# Patient Record
Sex: MALE | Race: WHITE | NOT HISPANIC OR LATINO | Employment: FULL TIME | URBAN - METROPOLITAN AREA
[De-identification: names, ages, dates, MRNs, and addresses within clinical notes are randomized per-mention and may not be internally consistent; named-entity substitution may affect disease eponyms.]

---

## 2022-11-01 ENCOUNTER — OFFICE VISIT (OUTPATIENT)
Dept: ORTHOPEDIC SURGERY | Facility: CLINIC | Age: 27
End: 2022-11-01

## 2022-11-01 VITALS — HEIGHT: 74 IN | OXYGEN SATURATION: 98 % | BODY MASS INDEX: 40.43 KG/M2 | HEART RATE: 74 BPM | WEIGHT: 315 LBS

## 2022-11-01 DIAGNOSIS — M75.42 IMPINGEMENT SYNDROME OF LEFT SHOULDER: ICD-10-CM

## 2022-11-01 DIAGNOSIS — M75.41 IMPINGEMENT SYNDROME OF RIGHT SHOULDER: Primary | ICD-10-CM

## 2022-11-01 PROCEDURE — 99203 OFFICE O/P NEW LOW 30 MIN: CPT | Performed by: ORTHOPAEDIC SURGERY

## 2022-11-01 RX ORDER — DICLOFENAC SODIUM 75 MG/1
75 TABLET, DELAYED RELEASE ORAL 2 TIMES DAILY
Qty: 60 TABLET | Refills: 1 | Status: SHIPPED | OUTPATIENT
Start: 2022-11-01

## 2022-11-01 RX ORDER — IBUPROFEN 800 MG/1
800 TABLET ORAL EVERY 8 HOURS PRN
COMMUNITY

## 2022-11-01 NOTE — PROGRESS NOTES
"Chief Complaint  Initial Evaluation of the Left Shoulder and Initial Evaluation of the Right Shoulder     Subjective      Tenzin Toro presents to [facility] for bilateral shoulders. Patient left shoulder was dislocated when he was 16 and no treatment.  Four years later did some surgery to fix.  Patient here for a check up.  Patient here with just stiffness no pain.  Right shoulder having no ROM issues but pain and clicking.  No new injury.  Patient reports pain in right elbow and not sure if relates to right shoulder.  Right shoulder pain is just the last year.      No Known Allergies     Social History     Socioeconomic History   • Marital status:    Tobacco Use   • Smoking status: Former     Types: Cigarettes   • Smokeless tobacco: Never        Review of Systems     Objective   Vital Signs:   Pulse 74   Ht 188 cm (74\")   Wt (!) 145 kg (320 lb)   SpO2 98%   BMI 41.09 kg/m²       Physical Exam  Constitutional:       Appearance: Normal appearance. Patient is well-developed and normal weight.   HENT:      Head: Normocephalic.      Right Ear: Hearing and external ear normal.      Left Ear: Hearing and external ear normal.      Nose: Nose normal.   Eyes:      Conjunctiva/sclera: Conjunctivae normal.   Cardiovascular:      Rate and Rhythm: Normal rate.   Pulmonary:      Effort: Pulmonary effort is normal.      Breath sounds: No wheezing or rales.   Abdominal:      Palpations: Abdomen is soft.      Tenderness: There is no abdominal tenderness.   Musculoskeletal:      Cervical back: Normal range of motion.   Skin:     Findings: No rash.   Neurological:      Mental Status: Patient is alert and oriented to person, place, and time.   Psychiatric:         Mood and Affect: Mood and affect normal.         Judgment: Judgment normal.       Ortho Exam      LEFT SHOULDER radial pulse 2+. Ulnar pulse 2+. Good tone of deltoid, bicep, triceps, wrist extensors and flexors. Sensation intact. " Neurovascular Intact. ROM full.  Negative impingement testing.     RIGHT SHOULDER Sensation intact. Neurovascular Intact. Radial pulse 2+. Ulnar pulse 2+. Good tone of hip flexors, hip extensors, and hip abductors. ROM full with pain at times. Positive impingement testing with internal rotation.        Procedures      Imaging Results (Most Recent)     None           Result Review :       Brought X-rays and left shoulder has no hardware complications.  No fracture.         Assessment and Plan     Diagnoses and all orders for this visit:    1. Impingement syndrome of right shoulder (Primary)    2. Impingement syndrome of left shoulder        Discussed conservative measures as exercises, anti-inflammatory and injection. Patient given anti-inflammatories and HEP for shoulder and elbow tendonitis.     Call or return if worsening symptoms.    Follow Up     PRN      Patient was given instructions and counseling regarding his condition or for health maintenance advice. Please see specific information pulled into the AVS if appropriate.     Scribed for August Aguilar MD by Joanie Penny MA.  11/01/22   11:04 EDT    I have personally performed the services described in this document as scribed by the above individual and it is both accurate and complete. August Aguilar MD 11/01/22

## 2024-01-21 ENCOUNTER — HOSPITAL ENCOUNTER (EMERGENCY)
Facility: HOSPITAL | Age: 29
Discharge: HOME | End: 2024-01-21
Attending: EMERGENCY MEDICINE | Admitting: EMERGENCY MEDICINE
Payer: COMMERCIAL

## 2024-01-21 VITALS
BODY MASS INDEX: 40.43 KG/M2 | TEMPERATURE: 98.4 F | HEIGHT: 74 IN | OXYGEN SATURATION: 98 % | WEIGHT: 315 LBS | RESPIRATION RATE: 18 BRPM | DIASTOLIC BLOOD PRESSURE: 81 MMHG | HEART RATE: 82 BPM | SYSTOLIC BLOOD PRESSURE: 150 MMHG

## 2024-01-21 DIAGNOSIS — L02.212 ABSCESS OF BACK: Primary | ICD-10-CM

## 2024-01-21 PROCEDURE — 99282 EMERGENCY DEPT VISIT SF MDM: CPT

## 2024-01-21 RX ORDER — CEPHALEXIN 500 MG/1
500 CAPSULE ORAL 4 TIMES DAILY
Qty: 40 CAPSULE | Refills: 0 | Status: SHIPPED | OUTPATIENT
Start: 2024-01-21

## 2024-01-21 NOTE — DISCHARGE INSTRUCTIONS
Apply warm compresses to area 6 times a day for 15-20 minutes at a time. Follow up with your primary care provider for wound check. It does not appear at this time that you need to have incision and drainage performed.

## 2024-01-21 NOTE — ED PROVIDER NOTES
Time: 2:55 PM EST  Date of encounter:  1/21/2024  Independent Historian/Clinical History and Information was obtained by:   Patient    History is limited by: N/A    Chief Complaint: cyst/abscess on back      History of Present Illness:  Patient is a 28 y.o. year old male who presents to the emergency department for evaluation of area of swelling on right mid thoracic back area present for over 1 year. The last few days the area has become more painful, reddened and swollen. His PCP referred him to dermatology about one year ago but he didn't follow up. Denies drainage, fever or new injury to the area.       History provided by:  Patient      Patient Care Team  Primary Care Provider: Sanket Valerio MD    Past Medical History:     No Known Allergies  History reviewed. No pertinent past medical history.  History reviewed. No pertinent surgical history.  History reviewed. No pertinent family history.    Home Medications:  Prior to Admission medications    Medication Sig Start Date End Date Taking? Authorizing Provider   diclofenac (VOLTAREN) 75 MG EC tablet Take 1 tablet by mouth 2 (Two) Times a Day. 11/1/22   August Aguilar MD   ibuprofen (ADVIL,MOTRIN) 800 MG tablet Take 1 tablet by mouth Every 8 (Eight) Hours As Needed for Mild Pain.    Provider, MD Janna        Social History:   Social History     Tobacco Use    Smoking status: Former     Types: Cigarettes    Smokeless tobacco: Never         Review of Systems:  Review of Systems   Constitutional:  Negative for chills and fever.   HENT:  Negative for congestion, ear pain and sore throat.    Eyes:  Negative for pain.   Respiratory:  Negative for cough, chest tightness and shortness of breath.    Cardiovascular:  Negative for chest pain.   Gastrointestinal:  Negative for abdominal pain, diarrhea, nausea and vomiting.   Genitourinary:  Negative for flank pain and hematuria.   Musculoskeletal:  Negative for joint swelling.   Skin:  Positive for wound.  "Negative for pallor.   Neurological:  Negative for seizures and headaches.   All other systems reviewed and are negative.       Physical Exam:  /81 (BP Location: Right arm, Patient Position: Sitting)   Pulse 82   Temp 98.4 °F (36.9 °C) (Oral)   Resp 18   Ht 188 cm (74\")   Wt (!) 147 kg (325 lb)   SpO2 98%   BMI 41.73 kg/m²     Physical Exam  Vitals and nursing note reviewed.   Constitutional:       General: He is not in acute distress.     Appearance: Normal appearance. He is not toxic-appearing.   HENT:      Head: Normocephalic and atraumatic.      Mouth/Throat:      Mouth: Mucous membranes are moist.   Eyes:      General: No scleral icterus.  Cardiovascular:      Rate and Rhythm: Normal rate and regular rhythm.      Pulses: Normal pulses.      Heart sounds: Normal heart sounds.   Pulmonary:      Effort: Pulmonary effort is normal. No respiratory distress.      Breath sounds: Normal breath sounds.   Abdominal:      General: Abdomen is flat.      Palpations: Abdomen is soft.      Tenderness: There is no abdominal tenderness.   Musculoskeletal:         General: Normal range of motion.        Arms:       Cervical back: Normal range of motion and neck supple.      Thoracic back: Swelling present.   Skin:     General: Skin is warm and dry.   Neurological:      Mental Status: He is alert and oriented to person, place, and time. Mental status is at baseline.                  Procedures:  Procedures      Medical Decision Making:      Comorbidities that affect care:    None    External Notes reviewed:    None      The following orders were placed and all results were independently analyzed by me:  No orders of the defined types were placed in this encounter.      Medications Given in the Emergency Department:  Medications - No data to display     ED Course:         Labs:    Lab Results (last 24 hours)       ** No results found for the last 24 hours. **             Imaging:    No Radiology Exams Resulted Within " Past 24 Hours      Differential Diagnosis and Discussion:    Abscess: Differential diagnosis for an abscess includes but is not limited to bacterial or fungal infections, foreign body reactions, malignancies, and autoimmune or inflammatory conditions.        MDM  Number of Diagnoses or Management Options  Abscess of back  Diagnosis management comments: Utilized bedside ultrasound to assess area, no collection of fluid seen so no need for I & D at this time                 Patient Care Considerations:    CT CHEST: I considered ordering a CT scan of the chest, however        Consultants/Shared Management Plan:    None    Social Determinants of Health:    Patient is independent, reliable, and has access to care.       Disposition and Care Coordination:    Discharged: The patient is suitable and stable for discharge with no need for consideration of admission.    I have explained the patient´s condition, diagnoses and treatment plan based on the information available to me at this time. I have answered questions and addressed any concerns. The patient has a good  understanding of the patient´s diagnosis, condition, and treatment plan as can be expected at this point. The vital signs have been stable. The patient´s condition is stable and appropriate for discharge from the emergency department.      The patient will pursue further outpatient evaluation with the primary care physician or other designated or consulting physician as outlined in the discharge instructions. They are agreeable to this plan of care and follow-up instructions have been explained in detail. The patient has received these instructions in written format and have expressed an understanding of the discharge instructions. The patient is aware that any significant change in condition or worsening of symptoms should prompt an immediate return to this or the closest emergency department or call to 911.    Final diagnoses:   Abscess of back        ED  Disposition       ED Disposition   Discharge    Condition   Stable    Comment   --               This medical record created using voice recognition software.             Chai Duval, APRN  01/21/24 1535       Chai Duval, APRN  01/21/24 1536